# Patient Record
Sex: MALE | Race: WHITE
[De-identification: names, ages, dates, MRNs, and addresses within clinical notes are randomized per-mention and may not be internally consistent; named-entity substitution may affect disease eponyms.]

---

## 2017-10-13 ENCOUNTER — HOSPITAL ENCOUNTER (EMERGENCY)
Dept: HOSPITAL 84 - D.ER | Age: 52
Discharge: ANOTHER HOSPITAL | End: 2017-10-13
Payer: MEDICARE

## 2017-10-13 VITALS — BODY MASS INDEX: 26.1 KG/M2

## 2017-10-13 DIAGNOSIS — F23: Primary | ICD-10-CM

## 2017-10-13 DIAGNOSIS — B20: ICD-10-CM

## 2017-10-13 DIAGNOSIS — R45.851: ICD-10-CM

## 2017-10-13 DIAGNOSIS — F17.200: ICD-10-CM

## 2017-10-13 LAB
ALBUMIN SERPL-MCNC: 4.1 G/DL (ref 3.4–5)
ALP SERPL-CCNC: 58 U/L (ref 46–116)
ALT SERPL-CCNC: 25 U/L (ref 10–68)
AMPHETAMINES UR QL SCN: NEGATIVE QUAL
ANION GAP SERPL CALC-SCNC: 10.4 MMOL/L (ref 8–16)
APAP SERPL-MCNC: 0 UG/ML (ref 10–30)
APPEARANCE UR: CLEAR
BARBITURATES UR QL SCN: NEGATIVE QUAL
BASOPHILS NFR BLD AUTO: 1.2 % (ref 0–2)
BENZODIAZ UR QL SCN: NEGATIVE QUAL
BILIRUB SERPL-MCNC: 0.31 MG/DL (ref 0.2–1.3)
BILIRUB SERPL-MCNC: NEGATIVE MG/DL
BUN SERPL-MCNC: 18 MG/DL (ref 7–18)
BZE UR QL SCN: NEGATIVE QUAL
CALCIUM SERPL-MCNC: 9.1 MG/DL (ref 8.5–10.1)
CANNABINOIDS UR QL SCN: NEGATIVE QUAL
CHLORIDE SERPL-SCNC: 104 MMOL/L (ref 98–107)
CO2 SERPL-SCNC: 27.9 MMOL/L (ref 21–32)
COLOR UR: (no result)
CREAT SERPL-MCNC: 1 MG/DL (ref 0.6–1.3)
EOSINOPHIL NFR BLD: 8.6 % (ref 0–7)
ERYTHROCYTE [DISTWIDTH] IN BLOOD BY AUTOMATED COUNT: 13.5 % (ref 11.5–14.5)
ETHANOL SERPL-MCNC: 1 MG/DL (ref 0–10)
GLOBULIN SER-MCNC: 2.9 G/L
GLUCOSE SERPL-MCNC: 117 MG/DL (ref 74–106)
HCT VFR BLD CALC: 39.2 % (ref 42–54)
HGB BLD-MCNC: 13.1 G/DL (ref 13.5–17.5)
IMM GRANULOCYTES NFR BLD: 0 % (ref 0–5)
KETONES UR STRIP-MCNC: NEGATIVE MG/DL
LITHIUM SERPL-SCNC: 0.66 MMOL/L (ref 0.6–1.2)
LYMPHOCYTES NFR BLD AUTO: 27.2 % (ref 15–50)
MCH RBC QN AUTO: 33.4 PG (ref 26–34)
MCHC RBC AUTO-ENTMCNC: 33.4 G/DL (ref 31–37)
MCV RBC: 100 FL (ref 80–100)
MONOCYTES NFR BLD: 6 % (ref 2–11)
NEUTROPHILS NFR BLD AUTO: 57 % (ref 40–80)
NITRITE UR-MCNC: NEGATIVE MG/ML
OPIATES UR QL SCN: POSITIVE QUAL
OSMOLALITY SERPL CALC.SUM OF ELEC: 278 MOSM/KG (ref 275–300)
PCP UR QL SCN: NEGATIVE QUAL
PH UR STRIP: 5 [PH] (ref 5–6)
PLATELET # BLD: 156 10X3/UL (ref 130–400)
PMV BLD AUTO: 10.1 FL (ref 7.4–10.4)
POTASSIUM SERPL-SCNC: 4.3 MMOL/L (ref 3.5–5.1)
PROT SERPL-MCNC: 7 G/DL (ref 6.4–8.2)
PROT UR-MCNC: NEGATIVE MG/DL
RBC # BLD AUTO: 3.92 10X6/UL (ref 4.2–6.1)
SALICYLATES SERPL-MCNC: 2.9 MG/DL (ref 2.8–20)
SODIUM SERPL-SCNC: 138 MMOL/L (ref 136–145)
SP GR UR STRIP: 1 (ref 1–1.02)
UROBILINOGEN UR-MCNC: NORMAL MG/DL
WBC # BLD AUTO: 6 10X3/UL (ref 4.8–10.8)

## 2017-10-24 ENCOUNTER — HOSPITAL ENCOUNTER (OUTPATIENT)
Dept: HOSPITAL 84 - D.ER | Age: 52
Setting detail: OBSERVATION
LOS: 17 days | Discharge: HOME | End: 2017-11-10
Attending: FAMILY MEDICINE | Admitting: FAMILY MEDICINE
Payer: MEDICARE

## 2017-10-24 VITALS
BODY MASS INDEX: 21.26 KG/M2 | BODY MASS INDEX: 21.26 KG/M2 | WEIGHT: 124.51 LBS | HEIGHT: 64 IN | WEIGHT: 124.51 LBS | HEIGHT: 64 IN | BODY MASS INDEX: 21.26 KG/M2

## 2017-10-24 DIAGNOSIS — B20: ICD-10-CM

## 2017-10-24 DIAGNOSIS — F20.9: ICD-10-CM

## 2017-10-24 DIAGNOSIS — G89.29: ICD-10-CM

## 2017-10-24 DIAGNOSIS — Z02.2: Primary | ICD-10-CM

## 2017-10-24 DIAGNOSIS — F31.9: ICD-10-CM

## 2017-10-24 LAB
ALBUMIN SERPL-MCNC: 3.7 G/DL (ref 3.4–5)
ALP SERPL-CCNC: 52 U/L (ref 46–116)
ALT SERPL-CCNC: 25 U/L (ref 10–68)
AMPHETAMINES UR QL SCN: NEGATIVE QUAL
ANION GAP SERPL CALC-SCNC: 10.4 MMOL/L (ref 8–16)
APPEARANCE UR: CLEAR
BARBITURATES UR QL SCN: NEGATIVE QUAL
BASOPHILS NFR BLD AUTO: 0.7 % (ref 0–2)
BENZODIAZ UR QL SCN: NEGATIVE QUAL
BILIRUB SERPL-MCNC: 0.36 MG/DL (ref 0.2–1.3)
BILIRUB SERPL-MCNC: NEGATIVE MG/DL
BUN SERPL-MCNC: 16 MG/DL (ref 7–18)
BZE UR QL SCN: NEGATIVE QUAL
CALCIUM SERPL-MCNC: 9 MG/DL (ref 8.5–10.1)
CANNABINOIDS UR QL SCN: NEGATIVE QUAL
CHLORIDE SERPL-SCNC: 108 MMOL/L (ref 98–107)
CO2 SERPL-SCNC: 29.8 MMOL/L (ref 21–32)
COLOR UR: YELLOW
CREAT SERPL-MCNC: 0.9 MG/DL (ref 0.6–1.3)
EOSINOPHIL NFR BLD: 2.8 % (ref 0–7)
ERYTHROCYTE [DISTWIDTH] IN BLOOD BY AUTOMATED COUNT: 13.3 % (ref 11.5–14.5)
GLOBULIN SER-MCNC: 2.8 G/L
GLUCOSE SERPL-MCNC: 73 MG/DL (ref 74–106)
GLUCOSE SERPL-MCNC: NEGATIVE MG/DL
HCT VFR BLD CALC: 38.7 % (ref 42–54)
HGB BLD-MCNC: 12.8 G/DL (ref 13.5–17.5)
IMM GRANULOCYTES NFR BLD: 0 % (ref 0–5)
INR PPP: 1.07 (ref 0.85–1.17)
KETONES UR STRIP-MCNC: NEGATIVE MG/DL
LYMPHOCYTES NFR BLD AUTO: 20.8 % (ref 15–50)
MCH RBC QN AUTO: 33.6 PG (ref 26–34)
MCHC RBC AUTO-ENTMCNC: 33.1 G/DL (ref 31–37)
MCV RBC: 101.6 FL (ref 80–100)
MONOCYTES NFR BLD: 7.9 % (ref 2–11)
NEUTROPHILS NFR BLD AUTO: 67.8 % (ref 40–80)
NITRITE UR-MCNC: NEGATIVE MG/ML
OPIATES UR QL SCN: POSITIVE QUAL
OSMOLALITY SERPL CALC.SUM OF ELEC: 286 MOSM/KG (ref 275–300)
PCP UR QL SCN: NEGATIVE QUAL
PH UR STRIP: 5 [PH] (ref 5–6)
PLATELET # BLD: 137 10X3/UL (ref 130–400)
PMV BLD AUTO: 9.7 FL (ref 7.4–10.4)
POTASSIUM SERPL-SCNC: 4.2 MMOL/L (ref 3.5–5.1)
PROT SERPL-MCNC: 6.5 G/DL (ref 6.4–8.2)
PROT UR-MCNC: NEGATIVE MG/DL
PROTHROMBIN TIME: 13.8 SECONDS (ref 11.6–15)
RBC # BLD AUTO: 3.81 10X6/UL (ref 4.2–6.1)
SODIUM SERPL-SCNC: 144 MMOL/L (ref 136–145)
SP GR UR STRIP: 1.02 (ref 1–1.02)
UROBILINOGEN UR-MCNC: NORMAL MG/DL
WBC # BLD AUTO: 5.8 10X3/UL (ref 4.8–10.8)

## 2017-10-24 NOTE — NUR
Patient arrived from the ER via wheelchair, alert and oriented to call light
and room. Call light in reach.

## 2017-10-25 VITALS — DIASTOLIC BLOOD PRESSURE: 87 MMHG | SYSTOLIC BLOOD PRESSURE: 103 MMHG

## 2017-10-25 VITALS — DIASTOLIC BLOOD PRESSURE: 64 MMHG | SYSTOLIC BLOOD PRESSURE: 143 MMHG

## 2017-10-25 VITALS — SYSTOLIC BLOOD PRESSURE: 119 MMHG | DIASTOLIC BLOOD PRESSURE: 59 MMHG

## 2017-10-25 VITALS — SYSTOLIC BLOOD PRESSURE: 135 MMHG | DIASTOLIC BLOOD PRESSURE: 79 MMHG

## 2017-10-25 VITALS — SYSTOLIC BLOOD PRESSURE: 118 MMHG | DIASTOLIC BLOOD PRESSURE: 72 MMHG

## 2017-10-25 NOTE — NUR
PT LYING IN BED, EYES CLOSED, RESPIRATIONS EVEN AND UNLABORED. PT IS EASILY
ROUSABLE TO VERBAL STIMULI, DENIES ANY NEEDS. I DID OFFER PT FOOD IN WHICH HE
ACCEPTED. PT GIVEN A TURKEY SANDWICH AND APPLE JUICE. PT IS DIFFICULT TO
UNDERSTAND AT TIMES, AS HE SPEAKS FAST AND HIS SPEECH IS GARBLED. PT TO CALL
WHEN NEEDING ANY ASSISTANCE. CONTINUE TO MONITOR CLOSELY. BED LOW, CALL LIGHT
IN REACH, SIDE RAILS X 2, HOB FLAT.

## 2017-10-25 NOTE — NUR
CARRI DUNHAM CALLED AND ASKED ABOUT PATIENT, ASKED PATIENT WHO SHE WAS AND IF
I COULD GIVE INFORMATION AND HE STATED "NO JUST TELL HER I AM THINKING ABOUT
PRATIBHA AND KELY", CARRI [id number] AND SHE STATED TO TELL HIM "PRATIBHA AND KELY
LOVE HIM" PATIENT INFORMED.

## 2017-10-25 NOTE — NUR
SPOKE WITH DR GRUBER NURSE INFORMED HER THAT DR RIOS NEEDED TO ADDRESS HOME
MEDICATIONS. FAXED PATIENT DRUG LIST TO OFFICE. CPOC

## 2017-10-25 NOTE — NUR
RECIEVED REPORT ON PATIENT, PATIENT IS ALERT AND ORIENTED AT THIS TIME.
PATIENT IS LAYING IN BED AT THIS TIME, DENIES ANY PAIN AT THIS TIME. PATIENT
INFORMED ME THAT HE WAS MISTREATED AT THE HOME HE WAS AT BEFORE ADMISSION, AND
THIS IS WHY HE IS HERE HE IS NEEDING PLACEMENT. WILL SPEAK WITH CASE
MANAGEMENT. PATIENT DENIES ANY OTHER NEEDS. WILL CONT TO MONITOR. BED LOW AND
LOCKED, CALL LIGHT IN REACH. CPOC

## 2017-10-25 NOTE — NUR
CASE MANAGEMENT WORKING WITH PATIENT, TRYING TO FIND PATIENT PLACEMENT.
PATIENT STATES THE PEOPLE HE LIVED WITH HAVE BEEN DRAWING FROM HIS BANK
ACCOUNT AND HIS EBT CARD. WILL LET CASE MANAGEMENT INVESTIGATING SITUATION.
CPOC

## 2017-10-25 NOTE — NUR
Patient Name: AZUL ETIENNE Admission Status: ER
Accout number: [id number] Admission Date: 10-
: 1965 Admission Diagnosis:
Attending: RACQUEL RIOS Current LOS: 1
 
Anticipated DC Date: 10-
Planned Disposition: Assisted Living
Primary Insurance: MEDICARE A & B
PLANNED EXTERNAL PROVIDER: FIRST AVAILABLE THAT WILL ACCEPT
 
Discharge Planning Comments:
* Is the patient Alert and Oriented? Yes 0
* How many steps to enter\exit or inside your home? 3 0
* PCP DR. EMILY DWYER 0
* Pharmacy [facility] 0
* Preadmission Environment Home with Family 0
* ADLs Partial Dependent 0
* Partial ADLs (Assistance needed) Medication Management 0
* Equipment Cane
Rolling Walker 0
* Other Equipment NO MEDICAL EQUIPMENT PROVIDER PREFERENCE 0
* List name and contact numbers for known caregivers / representatives who
currently or will assist patient after discharge: NONE 0
* Community resources currently utilized Other 0
* Please name any agencies selected above. MEDICAID - [facility] HOME
CARE SERVICES 0
* Additional services required to return to the preadmission environment? Yes
0
* Can the patient safely return to the preadmission environment? No 0
* Has this patient been hospitalized within the prior 30 days at any hospital?
Yes 0
 
CM RECEIVED ORDER FOR LONG TERM PLACEMENT OR GROUP HOME. CM CALLED ADULT
PROTECTIVE SERVICES WORKER NELLIE SLAUGHTER, [phone], LEFT MESSAGE
REQUESTING ANY KNOWN INFORMATION REGARDING PT. CM CALLED CHANDNI PALOMO OF ADULT
PROCTECTIVE SERVICES,[phone], LEFT MESSAGE REQUESTING ANY KNOWN
INFORMATION REGARDING PT. CM CALLED MUKUL IVERSON OF ADULT PROTECTIVE SERVICES,
[phone], REQEUSTED ANY KNOWN INFORMATION ON PT. MUKUL DID NOT KNOW
ANYTHING ABOUT PT OR HIS CIRCUMSTANCES. CM RECEIVED RETURN CALL FROM NELLIE SLAUGHTER OF ADULT PROTECTIVE SERVICES WHO REPORTS NOT HAVING ANY ACTIVE
INVESTIGATIONS OR CASES INDICATING PT HAS A GUARDIAN WITH DEPARTMENT OF HUMAN
SERVICES.
 
CM CALLED [facility] DEPARTMENT OF HUMAN SERVICES, LEFT TWO MESSAGES FOR
ROXY THOMAS OF MEDICAID WAIVER SERVICES, [phone], REQUESTING INFORMATION
REGARDING PT'S WAIVER STATUS.
 
CM SPOKE TO PT IN ROOM WHO REPORTED THAT HE ONCE LIVES WITH [employer] WHO
WAS PT'S LEGAL GUARDIAN BUT ADULT PROTECTIVE SERVICES CAME AND PUT [facility]ANC[facility] IN A
NURSING HOME; PT HAS NOT SEEN OR HEARD FROM [facility] IN THE PAST 12 YEARS. PT
STATES HE DOES NOT HAVE A GUARDIAN NOW. [facility] HELPED HIM GET
INTO [facility] PROGRAM WITH JEREMIE DUNHAM ([phone] /
[phone]). THE WAIVER RAN OUT AND THEY HAVEN'T BEEN GETTING PAID SINCE
AUGUST OR SEPTEMBER OF LAST YEAR, ARE NOW HAVING FINANCIAL PROBLEMS AND COULD
NOT TAKE CARE OF PT ANY LONG AND LEFT HIM IN THE EMERGENCY ROOM FOR THE
HOSPITAL TO HELP HIM FIND A PLACE TO LIVE. PT DOES NOT KNOW HOW MUCH HIS CHECK
IS, BUT RECEIVES SOCIAL SECURITY FOR HIS BIPOLAR AND SCHIZOPHRENIA. PT REPORTS
HE CAN COOK IN A MICROWAVE AND CAN READ AND WRITE; HE REPORTS HAVING HIS GED.
PT STATES THAT CHRISTOPHE AND CARRI GAVE HIM THIS CARD (SHOWED CM BANK CARD FROM
HIS POCKET) AND TOLD HIM IT WAS TO GET HIS MONEY; PT REPORTS HE DOES NOT EVEN
KNOW HOW TO USE IT (THE CARD). PT HAS MEDICATION MANAGEMENT WITH [facility], NOW [facility] WITH DR. [facility] DUNHAM. PT REPORTS HIS
COUNSELOR DROPPED HIM BECAUSE THE AK[facility] WOULD NOT TAKE HIM TO HIS
APPOINTMENTS. PT REPORTS HIS CAREGIVERS MISTREATED HIM BY TELLING HIM TO SHUT
UP AND TOLD HIM HE TALKED TOO FAST. PT REPORTS HE DOES NOT WANT TO LIVE BY
HIMSELF AND HE IS USED TO SOMEONE TO ARRANGING HIS MEDICATIONS IN A BOX THAT
ARRANGES THEM FOR THE TIMES HE TAKES THEM. PT IS NOT ABLE TO DRIVE. PT USUALLY
WALKS WITH A CANE AND HAS HIS ROLLING WALKER WITH HIM. PT STATES THAT GIOVANNI
MARCK OF [facility] WAS TRYING TO GET HIM INTO A GROUP HOME. PT
REPORTS HE WANTS CM TO FIND HIM SOMEWHERE TO LIVE AND HAS NO ONE ELSE TO HELP
HIM.
 
CM CALLED [facility], [phone], LEFT MULTIPLE
MESSAGES FOR GIOVANNI CARMICHAELMICAH; CM CALLED AND SPOKE TO THE  WHO DETERMINED
THAT GIOVANNI IS NOT AT WORK, CM ASKED TO SPEAK TO WHOEVER COULD HELP. CM SPOKE
TO OUTPATIENT DIRECTOR EMORY, [phone], WHO REPORTS THEY HAVE NO GROUP
HOMES BUT CAN ASSIST WITH PROVIDING RECORDS TO ASSIST WITH PLACEMENT.
EMORY REPORTED THAT THE AK[facility]AR TRIED TO LEAVE PT WITH THEM LAST FRIDAY
EVENING; WHEN [facility] INFORMED THEM THAT APS WOULD BE CALLED, THE GIOVANA LEFT
WITH PATIENT. CM CALLED GUEVARA ZHAO [facility] BEHAVIORAL RECORDS, [phone],
REQUESTED PSYCHIATRIC RECORDS BE FAXED TO CM TO ASSIST WITH PLACEMENT.
 
CM CALLED ADULT PROTECTIVE SERVICES, [phone], REPORTED ABANDONMENT.
 
CM CALLED THE [facility] IN [place], SPOKE TO JOSEMANUEL WHO REPORTS HAVING
APARTMENT AVAILABLE BUT PT DOES NOT HAVE THE PROPER MEDICAID AND WILL NEED TO
APPLY FOR ASSISTED LIVING MEDICAID AND IT WILL HAVE TO BE APPROVED PRIOR TO
ANY APPLICATION CONSIDERATION. THE MEDICAID REVIEW PROCESS TAKES 45 OR MORE
DAYS.
 
CM CALLED ELÍAS ALVAREZ WITH [facility], [phone], EXT [phone];
ELÍAS REPORTS HE MAY HAVE A BED NEXT WEDNESDAY AND CAN REVIEW RECORDS FOR
ADMISSION. ALL RECORDS FOR PLACEMENT REFERRAL TO BE FAXED [phone].
 
CM CALLED [facility] FOR DELIA, [phone], VOICE MAIL WAS FULL, CM LEFT
CALL BACK NUMBER IN PAGER, DID NOT RECEIVE RETURN CALL.
 
CM DOES NOT BELIEVE PT TO BE APPROPRIATE FOR HOMELESS SHELTER PLACEMENT BASED
ON ABOVE ASSESSSMENT. CM TO FAX REFERRAL TO [facility] GROUP ONCE RECORDS ARE
RECEIVED FROM [facility] AND [facility]. CM WILL CONTINUE TO SEEK
PLACEMENT.
 
: Grady Goff
 
 
 [id number]: Grady Goff

## 2017-10-26 VITALS — SYSTOLIC BLOOD PRESSURE: 113 MMHG | DIASTOLIC BLOOD PRESSURE: 65 MMHG

## 2017-10-26 VITALS — SYSTOLIC BLOOD PRESSURE: 119 MMHG | DIASTOLIC BLOOD PRESSURE: 64 MMHG

## 2017-10-26 VITALS — DIASTOLIC BLOOD PRESSURE: 61 MMHG | SYSTOLIC BLOOD PRESSURE: 113 MMHG

## 2017-10-26 VITALS — SYSTOLIC BLOOD PRESSURE: 126 MMHG | DIASTOLIC BLOOD PRESSURE: 79 MMHG

## 2017-10-26 VITALS — SYSTOLIC BLOOD PRESSURE: 147 MMHG | DIASTOLIC BLOOD PRESSURE: 92 MMHG

## 2017-10-26 VITALS — SYSTOLIC BLOOD PRESSURE: 122 MMHG | DIASTOLIC BLOOD PRESSURE: 71 MMHG

## 2017-10-26 NOTE — HP
PATIENT: AZUL ETIENNE                                 MEDICAL RECORD: [id number]
ACCOUNT: [id number]                                    LOCATION:[address]
: 63                                            ADMISSION DATE: 10/24/17
                                                         
 
                             HISTORY AND PHYSICAL EXAMINATION
 
 
HISTORY OF PRESENT ILLNESS:  A 52-year-old  male brought in to the
Emergency Room, reports he was sent over here from [facility] for
placement.  He has no acute change in his medical status.  He had been living at
somebody's house.  No family is with him presently and he was told by [facility] to bring him to the Emergency Room and he would be placed.  He has a
history of bipolar disorder, schizophrenia, long-term HIV.  He is followed by
Dr. Donnie Eller for his HIV.  He is stable.  No acute exacerbations.  He
reports he has been stable on his current medications.  He has no thoughts of
harm to himself or others.  He is afebrile, denies chest pain.
 
REVIEW OF SYSTEMS:
HEENT:  Denies cephalgia, visual changes, tinnitus or epistaxis.
CARDIOVASCULAR:  Denies chest pain or palpitations.
PULMONARY:  Denies hemoptysis.
GASTROINTESTINAL:  Denies hematemesis, hematochezia or melena.
GENITOURINARY:  Denies dysuria.
MUSCULOSKELETAL:  No acute changes.
ENDOCRINE:  Denies polyuria, polydipsia or polyphagia.
PSYCHIATRIC:  History as above.  Reports being stable on his current
medications.
 
CURRENT MEDICATIONS:  Reviewed and as per chart.
 
PHYSICAL EXAMINATION:
VITAL SIGNS:  Temp 98.2, blood pressure is 143/94, heart rate 98, respirations
16 and O2 sats 99% room air.
GENERAL:  The patient is dressed in street clothes, sitting up.  Alert. 
Oriented to person, place and time.  Answers appropriately, in no acute
distress.
HEENT:  Normocephalic, atraumatic.  Eyes:  Pupils are equally round and
reactive.  Ears:  Canals patent.  Nose:  Nares patent.  Throat:  No erythema, no
exudates.
NECK:  Supple.  No lymphadenopathy.
HEART:  Regular rate and rhythm.
LUNGS:  Clear.
ABDOMEN:  Soft and nontender.  Bowel sounds positive in all 4 quadrants.
EXTREMITIES:  Present times 4.  No edema.
NEUROLOGIC:  Intact.
SKIN:  Warm and dry.  No rash.
PSYCHIATRIC:  The patient is calm, alert, answers appropriately.  Denies any
thought of harm to himself or others.  Does have extensive history, but appears
stable on current medications.
 
ASSESSMENT AND PLAN:  Social admit for placement, multiple comorbidities with
history of bipolar disorder and stable human immunodeficiency virus.  We will
continue medications.   is consulted for placement.  This appears
that it should have been dealt with by the  at [facility], and this patient was not done any service by being shipped to the ER
with his past history.
 
 
 
 
HISTORY AND PHYSICAL                           [id number]    AZUL ETIENNE         
 
 
TRANSINT:[id number] Voice Confirmation ID: [id number] DOCUMENT ID: [id number]
 
 
                                           
                                           RACQUEL RIOS DO            
 
 
 
Electronically Signed by RACQUEL CUBA on 10/26/17 at 0802
 
 
 
 
 
 
 
 
 
 
 
 
 
 
 
 
 
 
 
 
 
 
 
 
 
 
 
 
 
 
 
 
 
 
 
 
 
 
CC:                                                             [id number]
DICTATION DATE: 10/25/17 0806     :     10/25/17 0851      ADM IN  
                                                                              
[facility]                                          
[address] [place] [address]

## 2017-10-26 NOTE — NUR
Patient Name: AZUL ETIENNE
Encounter No: [id number]
: 1965
Primary Insurance: MEDICARE A & B
Anticipated DC Date: 10-
Planned Disposition: Assisted Living
External Planned Provider: FIRST AVAILABLE ACCEPTING FACILITY OR HOME
 
 
DCP follow-up note: CM CALLED MUKUL IVERSON OF ADULT PROTECTIVE SERVICES,
[phone], TO REQUEST NAME AND LOCATION OF A RESIDENTIAL TREATMENT FACILITY
THAT MAY ACCEPT PT AS HE HAS ASSISTED CM IN THE PAST WITH ANOTHER DIFFICULT TO
PLACE PT. CM LEFT MESSAGE ASKING FOR THE INFORMATION.
 
CM FAXED INITIAL REFERRAL TO ELÍAS ALVAREZ OF [facility] WHO MAY
HAVE A ROOM AVAILABLE BY WEDNEDAY OF NEXT WEE, FAXED [phone]. CM WILL NEED
TO SEND PSYCHIATRIC RECORD FROM [facility] ONCE RECEIVED.
 
CM CALLED DELIA OF [facility], [phone], VOICE MAIL WAS FULL, CM
PAGED WITH PHONE NUMBER. WAITING ON RETURN CALL.
 
CM CALLED [facility], [phone], SPOKE TO GABY
WHO REPORTS HE DOES NOT THINK THEY WOULD ACCEPT PT AND HAVE NO ROOMS ANYWAY.
 
CM SPOKE TO CRISTIANE OF [facility], PT CALLED HER AND SHE CAME TO
ASSIST WITH GETTING PT INTO LONG TERM CARE AT PT'S REQUEST. CM SPOKE TO PT WHO
WOULD LIKE TO GO THE NURSING HOME IF POSSIBLE. MELVINA PROVIDED CRISTIANE WITH MEDICAL
RECORD, PT HAS NO "SKILLED NEED" AND IS NOT APPROPRIATE FOR LONG TERM CARE IN
A NURSING HOME. PT NOTIFIED.
 
CM TO CONTINUE TO SEEK PLACEMENT.
 
Grady Goff, CASE MANAGEMENT

## 2017-10-26 NOTE — NUR
AMBULATING IN ROOM. ALERT/ORIENTED X 4. DENIES ANY NEEDS. HAS A SPEECH
IMPEDIMENT AND SLIGHTLY DIFFICULT TO UNDERSTAND. ORIENTED TO CALL LIGHT FOR
ANY NEEDS.

## 2017-10-26 NOTE — NUR
AM ROUNDS - PT IN BED AND APPEARS TO BE SLEEPING AT THIS TIME WITH EQUAL AND
NON LABORED BREATHING.  PT IS ON ROOM AIR.  NO IV.  BED AT LOWEST POSITION.
CALL BELL IN USE/REACH.  SIDE RAILS UP X2.  WILL CONTINUE TO MONITOR

## 2017-10-27 VITALS — DIASTOLIC BLOOD PRESSURE: 57 MMHG | SYSTOLIC BLOOD PRESSURE: 98 MMHG

## 2017-10-27 VITALS — SYSTOLIC BLOOD PRESSURE: 121 MMHG | DIASTOLIC BLOOD PRESSURE: 86 MMHG

## 2017-10-27 VITALS — DIASTOLIC BLOOD PRESSURE: 45 MMHG | SYSTOLIC BLOOD PRESSURE: 100 MMHG

## 2017-10-27 VITALS — SYSTOLIC BLOOD PRESSURE: 95 MMHG | DIASTOLIC BLOOD PRESSURE: 52 MMHG

## 2017-10-27 VITALS — SYSTOLIC BLOOD PRESSURE: 109 MMHG | DIASTOLIC BLOOD PRESSURE: 67 MMHG

## 2017-10-27 VITALS — SYSTOLIC BLOOD PRESSURE: 129 MMHG | DIASTOLIC BLOOD PRESSURE: 70 MMHG

## 2017-10-27 NOTE — NUR
Patient Name: AZUL ETIENNE
Encounter No: [id number]
: 1965
Primary Insurance: MEDICARE A & B
Anticipated DC Date: 10-
Planned Disposition: Assisted Living
External Planned Provider: FIRST AVAILABLE ACCEPTING FACILITY
 
DCP follow-up note: CM SPOKE TO DR. RIOS AND PROVIDED UPDATE; DR. RIOS
SUGGESTED CM CALL [facility] TO SEE IF THEY CAN ASSIST OR HAVE
SUGGESTIONS FOR PLACEMENT. CM CALLED THE [facility], [phone], SPOKE TO
RODRIGUEZ WHO REPORTS THAT HE HAD SPOKEN TO PT SEVERAL TIMES REGARDING GETTING OUT
OF THE LIVING SITUATION HE WAS IN. RODRIGUEZ BELIEVES MEDICAID STOPPED PAYING FOR
PT'S HOME CARE AND THAT IS WHY THE [facility] DIDN'T WANT PT ANYMORE. RODRIGUEZ
REPORTS HAVING "NO ANSWERS" FOR CM REGARDING PLACEMENT FOR PT.
 
CM CALLED DELIA OF [facility], [phone], VOICE MAIL WAS FULL, CM
PAGED WITH PHONE NUMBER. WAITING ON RETURN CALL.
 
CM RECEIVED CALL FROM NELLIE SLAUGHTER OF [facility] WHO HAS
RECEIVED CM REFERRAL. NELLIE CAME TO HOSPITAL, MET WITH PT AND RECEIVED
RECORDS FROM HOSPITAL STAY. NELLIE REPORTS NO HOLD WILL BE TAKEN ON PT AND
SHE WILL ASSIST CM WITH PLACEMENT. PT REPORTS TO NELLIE THAT HE WILL GO
ANYWHERE TO LIVE. JANELL DOES NOT THINK A HOMELESS SHELTER IS APPROPRIATE.
NELLIE SUGGESTED [facility] IN [place], WHO MAY NOT REQUIRE
PT TO BE ELIGIBLE FOR ASSISTED LIVING MEDICAID PRIOR TO ACCEPTANCE.
CM CALLED [facility], [phone], SPOKE TO KEYA WHO
REPORTS MELANIE WOULD BE PERSON TO SPEAK TO AND SHE WILL HAVE MELANIE CALL CM.
CM CALLED [facility], [phone], SPOKE TO EVA WHO EXPLAINED
THE PROCESS OF EVALUATION FOR ADMISSION. THEY STAFF FOR ADMISSION ONLY ON
. PACKET REQUIRES:
PHYCHIATRIC EVAL AND TREATMENT HISTORY, SOCIAL HISTORY, SUBSTANCE ABUSE
HISTORY AND TREATMENT, PHYSICAL EXAM AND MED HISTORY, FACE SHEET, AFTERCARE
PLAN, LEGAL DOCUMENTATION (COURT ORDERS, ECT.), MEDICATION RECORD, CURRENT
LABS, PHYCHOLOGICAL INFORMATION, PHYSICIANS PROGRESS NOTES, DISCHARGE SUMMARY,
SIGNED SMI CERTIFICATION, GUARDIANSHIP INFORMATION AND VERBAL AND WRITTEN
COMMUNICATION.
ERICHUM[facility] HAS NOT RECEIVED ANY APPLICATIONS FOR ADMISSION OF THIS PT TO DATE.
EVA REPORTS HE SPOKE TO CARRI DUNHAM VIA PHONE REGARDING PLACEMENT.
CM RECEIVED RELEASED FOR [facility]; CM
DISCUSSED PLACEMENT WITH PT WHO REPORTS THAT ALTHOUGH HE PREFERS TO REMAIN IN
[place], HE WILL GO ANYWHERE IN THE [place] IF HE HAS TO. CM FAXED COMPLETE
MEDICAL CHART AND SIGNED RELEASES FOR MEDICAL RECORDS TO [facility] AT
[phone].
 
CM HAS NOT RECEIVED MEDICAL RECORDS AS OF YET FROM PT'S MENTAL HEALTH
PROVIDER; CALLED [facility],
[phone], LEFT TWO MESSAGES FOR GUEVARA REQUESTING MEDICAL RECORDS BE FAXED
TO CM FOR PURPOSE OF PLACEMENT.
 
CM CALLED [facility], [phone], SPOKE TO KEYA WHO REPORTS THAT MELANIE
IS NOT IN TODAY BUT CM CAN FAX REFERRAL: CM FAXED REFERRAL TO [facility] [phone].
 
PT SPOKE TO PT IN [facility], PT REPORTS EMERGENCY CONTACTS OF HIS MOTHER,
ANGIE ETIENNE, [phone] AND BROTHER, VICKY ETIENNE, [phone]. PT
ASKED CM TO CALL HIS MOTHER AND GIVE HER AN UPDATE: CM CALLED ANGIE REED
[phone], PROVIDED UPDATE ON PLACEMENT EFFORTS. LORI AGAIN REPORTS
SHE IS NOT ABLE TO CARE FOR PT AT HER HOME AND HAS NO FAMILY OR FRIENDS THAT
CAN.  BEDSIDE NURSE UPDATED ON PLACEMENT EFFORTS.
 
CM WAITING ADMISSION DETERMINATIONS FROM [facility] AND [facility].
 
Grady Goff

## 2017-10-27 NOTE — NUR
AM ROUNDS - PT IS IN BED AND AWAKE AT THIS TIME.  NO IV.  PT ON ROOM AIR.  PT
IS UP AD DEWAYNE.  BED AT LOWEST POSITION.  CALL BELL IN USE/REACH.  SIDE RAILS UP
X2.  NO NEEDS AT THIS TIME.  WILL COTNINUE TO MONITOR

## 2017-10-27 NOTE — NUR
AMBULATING IN ROOM. ALERT/ORIENTED X 4. DENIES ANY NEEDS OR DISCOMFORTS. BED
IS LOW WITH SR UP X2. ORIENTED TO CALL LIGHT.

## 2017-10-28 VITALS — DIASTOLIC BLOOD PRESSURE: 79 MMHG | SYSTOLIC BLOOD PRESSURE: 145 MMHG

## 2017-10-28 VITALS — SYSTOLIC BLOOD PRESSURE: 106 MMHG | DIASTOLIC BLOOD PRESSURE: 71 MMHG

## 2017-10-28 VITALS — DIASTOLIC BLOOD PRESSURE: 81 MMHG | SYSTOLIC BLOOD PRESSURE: 129 MMHG

## 2017-10-28 VITALS — SYSTOLIC BLOOD PRESSURE: 105 MMHG | DIASTOLIC BLOOD PRESSURE: 60 MMHG

## 2017-10-28 VITALS — DIASTOLIC BLOOD PRESSURE: 71 MMHG | SYSTOLIC BLOOD PRESSURE: 107 MMHG

## 2017-10-28 NOTE — NUR
CNA PRESENT IN ROOM TAKING VS. ALERT/AWAKE ORIENTED X 4. NO NEEDS OR CONCERNS
VOICED. BED IS LOW WITH SR UP X2. ORIENTED TO CALL LIGHT FOR ANY NEEDS.

## 2017-10-28 NOTE — NUR
RECEIVED REPORT. ASSUMED CARE OF PATIENT. CALL LIGHT WITHIN REACH. PATIENT
AMBULATING AROUND ROOM TRYING TO CLEAN UP URINE INCONTINENT EPISODE. DENIES
NEEDS. NO DISTRESS. CALL LIGHT WITHIN REACH.

## 2017-10-28 NOTE — NUR
PATIENT AT NURSES STATION, AMBULATING AROUND UNIT WITH WALKER. NO DISTRESS.
DENIES NEEDS AT THIS TIME.

## 2017-10-29 VITALS — SYSTOLIC BLOOD PRESSURE: 118 MMHG | DIASTOLIC BLOOD PRESSURE: 72 MMHG

## 2017-10-29 VITALS — DIASTOLIC BLOOD PRESSURE: 65 MMHG | SYSTOLIC BLOOD PRESSURE: 118 MMHG

## 2017-10-29 VITALS — SYSTOLIC BLOOD PRESSURE: 101 MMHG | DIASTOLIC BLOOD PRESSURE: 65 MMHG

## 2017-10-29 VITALS — DIASTOLIC BLOOD PRESSURE: 67 MMHG | SYSTOLIC BLOOD PRESSURE: 109 MMHG

## 2017-10-29 VITALS — SYSTOLIC BLOOD PRESSURE: 95 MMHG | DIASTOLIC BLOOD PRESSURE: 53 MMHG

## 2017-10-29 VITALS — SYSTOLIC BLOOD PRESSURE: 91 MMHG | DIASTOLIC BLOOD PRESSURE: 55 MMHG

## 2017-10-29 NOTE — NUR
REPORT RECIEVED FROM OFF GOING RN. PT AWAKE AND RESTING IN BED. ASSISTED PT TO
EMPTY HER COLOSTOMY OF 400ML LIQUID STOOL. REQUESTED JUICE PROVIDED. NO OTHER
NEEDS.

## 2017-10-29 NOTE — NUR
BEDTIME MEDS GIVEN. PT REQUESTED TYLENOL FOR GENERALIZED PAIN/DISCOMFORT. HE
HAS PERFORMED ALL HIS BEDTIME ADLS. VOICING NO OTHER NEEDS. CPOC.

## 2017-10-30 VITALS — SYSTOLIC BLOOD PRESSURE: 112 MMHG | DIASTOLIC BLOOD PRESSURE: 64 MMHG

## 2017-10-30 VITALS — DIASTOLIC BLOOD PRESSURE: 68 MMHG | SYSTOLIC BLOOD PRESSURE: 116 MMHG

## 2017-10-30 VITALS — SYSTOLIC BLOOD PRESSURE: 107 MMHG | DIASTOLIC BLOOD PRESSURE: 70 MMHG

## 2017-10-30 VITALS — DIASTOLIC BLOOD PRESSURE: 68 MMHG | SYSTOLIC BLOOD PRESSURE: 110 MMHG

## 2017-10-30 VITALS — SYSTOLIC BLOOD PRESSURE: 93 MMHG | DIASTOLIC BLOOD PRESSURE: 63 MMHG

## 2017-10-30 LAB
ALBUMIN SERPL-MCNC: 3.4 G/DL (ref 3.4–5)
ALP SERPL-CCNC: 45 U/L (ref 46–116)
ALT SERPL-CCNC: 24 U/L (ref 10–68)
ANION GAP SERPL CALC-SCNC: 12.4 MMOL/L (ref 8–16)
BASOPHILS NFR BLD AUTO: 0.8 % (ref 0–2)
BILIRUB SERPL-MCNC: 0.21 MG/DL (ref 0.2–1.3)
BUN SERPL-MCNC: 13 MG/DL (ref 7–18)
CALCIUM SERPL-MCNC: 8.9 MG/DL (ref 8.5–10.1)
CHLORIDE SERPL-SCNC: 106 MMOL/L (ref 98–107)
CO2 SERPL-SCNC: 27.2 MMOL/L (ref 21–32)
CREAT SERPL-MCNC: 0.8 MG/DL (ref 0.6–1.3)
EOSINOPHIL NFR BLD: 6.7 % (ref 0–7)
ERYTHROCYTE [DISTWIDTH] IN BLOOD BY AUTOMATED COUNT: 13 % (ref 11.5–14.5)
GLOBULIN SER-MCNC: 2.8 G/L
GLUCOSE SERPL-MCNC: 81 MG/DL (ref 74–106)
HCT VFR BLD CALC: 37 % (ref 42–54)
HGB BLD-MCNC: 12 G/DL (ref 13.5–17.5)
IMM GRANULOCYTES NFR BLD: 0.2 % (ref 0–5)
LYMPHOCYTES NFR BLD AUTO: 51.4 % (ref 15–50)
MCH RBC QN AUTO: 32.8 PG (ref 26–34)
MCHC RBC AUTO-ENTMCNC: 32.4 G/DL (ref 31–37)
MCV RBC: 101.1 FL (ref 80–100)
MONOCYTES NFR BLD: 8.1 % (ref 2–11)
NEUTROPHILS NFR BLD AUTO: 32.8 % (ref 40–80)
OSMOLALITY SERPL CALC.SUM OF ELEC: 279 MOSM/KG (ref 275–300)
PLATELET # BLD: 141 10X3/UL (ref 130–400)
PMV BLD AUTO: 10.5 FL (ref 7.4–10.4)
POTASSIUM SERPL-SCNC: 4.6 MMOL/L (ref 3.5–5.1)
PROT SERPL-MCNC: 6.2 G/DL (ref 6.4–8.2)
RBC # BLD AUTO: 3.66 10X6/UL (ref 4.2–6.1)
SODIUM SERPL-SCNC: 141 MMOL/L (ref 136–145)
WBC # BLD AUTO: 5.1 10X3/UL (ref 4.8–10.8)

## 2017-10-30 NOTE — NUR
Patient Name: AZUL ETIENNE
Encounter No: [id number]
: 1965
Primary Insurance: MEDICARE A & B
Anticipated DC Date: 10-
Planned Disposition: Assisted Living
External Planned Provider: FIRST ACCEPTING FACILITY
 
 
DCP follow-up note: CM RECEIVED CALL FROM GUEVARA OF [facility] WHO REPORTS SHE WAS OUT THURSDAY AND FRIDAY OF LAST WEEK. SHE REPORTS
SENDING REQUESTED MENTAL HEALTH RECORDS AND DOES NOT KNOW WHY CM DID NOT
RECEIVE THEM LAST WEEK. GUEVARA WILL REFAX THE RECORDS THIS MORNING TO CM.
 
CM RECEIVED CALL FROM ROXY THOMAS OF [facility] HUMAN SERVICES,
[phone], [phone], WHO ADVISED THAT PT HAS ACTIVE SSI, SSI ARCHOICES, AAPD
WAIVER. PT'S MEDICAID IS OPEN. ROXY ADVISED THAT ASSISTED LIVING MEDICAID
WOULD HAVE TO BE APPROVED IN MOST CASES PRIOR TO ANY ASSISTED LIVING
ACCEPTANCE. ROXY HAD NO SUGGESTIONS FOR CM SEEKING PLACEMENT FOR PT.
 
CM CALLED [facility]ICES [facility] FOR [facility],
[phone], SPOKE TO SANDRO; SANDRO SUGGESTED NURSING HOME CARE, TO CONTACT
PT'S MENTAL HEALTH PROVIDER IN THE COMMUNITY FOR ASSISTANCE, BEHAVIORAL HEALTH
SERVICES FOR [place] [phone] AND [facility] ADULT FAMILY
HOMES, [facility], [phone].
 
CM CALLED BEHAVIORAL HEALTH SERVICES [facility] [phone], SPOKE TO
DR. LI WHO RECOMMENDED [facility] FOR PLACEMENT AND WILL EMAIL A LISTING OF
RESIDENTIAL TREATMENT FACILITIES FOR THE [facility] TO ASSIST CM IN SEARCH FOR
PLACEMENT. DR. LI ALSO ADVISED CM THAT LIV DE JESUS OF [facility], [phone] MAY BE ABLE TO PROVIDE SOME ASSISTANCE.
 
CM CALLED [facility], [phone], SPOKE TO LIV DE [facility]US AND GIOVANNI ACHARYA. GIOVANNI REPORTED THAT HE DID OFFER THE CAREGIVER
ASSISTANCE WITH PLACEMENT BUT SHE REFUSED AND SAID THAT [facility] HAD
ACCEPTED PT AND ONLY NEEDED ACUTE INPATIENT PSYCHIATRIC STABALIZATION FOR THEM
TO ACCEPT PT. LIV WILL CHECK AS SHE THINKS [facility]
MAY HAVE A HOUSING PROGRAM THAT INCLUDES DAY TREATMENT AND TRANSPORTATION.
 
CM CALLED ROXY THOMAS OF [facility], [phone],
, ASKED TO MAKE SURE THAT CURRENT CAREGIVERS WHO DROPPED PT OFF DID NOT
RECEIVE PAYMENT FOR THE COMING MONTH AND ASKED FOR SUGGESTIONS FOR PLACEMENT;
ROXY DIRECTED CM TO CALL THE [facility], [phone] AND
PRIVATE CARE HOME ON [facility], [phone].
CM CALLED [facility], [phone], SPOKE TO RAE AND
REQUESTED PLACEMENT FOR PT. RAE REPORTS SHE WILL HAVE HER BOSS CALL CM AS
SOON AS POSSIBLE. CM CALLED PRIVATE CARE HOME, [address] [facility],
[phone], THE LADY ANSWERING REPORTED HER HOME IS NOT ACCEPTING MEDICAID
AND PRIVATE PAY OF $5,300 PER MONTH ONLY.
 
CM RECEIVED CALL FROM LIV DE JESUS OF [facility],
[phone], WHO PROVIDED STATE WAIVER NURSE CONTACT INFORMATION TO CM: CHRISTO DE LOS [facility]ANT[facility], [phone], ; CM CALLED AND SPOKE TO CHRISTO WHO REPORTS THAT
[facility] WAS MERGED [facility] AAPD WAIVER PROGRAM, THE PROGRAM DOES NOT HAVE A
LISTING OF PROVIDERS; CLIENTS FIND THEIR OWN CAREGIVERS WHO APPLY FOR THE
WAIVER PROGRAM TO GET PAID TO CARE FOR THE PT. IF PT IS WITHOUT WAIVER
SERVICES FOR 30 DAYS, IT WILL CLOSE. CHRISTO HAD NO SUGGESTIONS ON WHERE TO SEEK
PLACEMENT FOR PATIENT.
 
CM CALLED [facility] [place], [phone], SPOKE TO CHAPARRO GOMEZ
WHO REPORTS HAVING ONLY ONE MEDICAID CARE HOME IN [place], IT IS LOCATED IN
[place], [place]. CHAPARRO TOOK CM AND PT INFORMATION AND REPORTED THAT SHE WOULD
CALL CM BACK.
 
CM RECEIVED CALL FROM COWORKER OF [facility] AT [facility] WHO WILL FAX CM AN APPLICATION TO COMPLETE FOR "[facility]" TO
CONSIDER FOR PLACEMENT.
 
CM RECEIVED CURRENT AND LIMITED MENTAL HEALTH RECORDS FROM [facility]. CM FAXED TO [facility] [phone], [facility] AT
[phone] AND [facility] [phone].
 
LEISA QIU, CASE MANAGEMENT

## 2017-10-30 NOTE — NUR
PT IS WALKING THE FLOOR WITH ASSISTANCE FROM HIS PERSONAL WALKER.  NO NEEDS AT
THIS TIME.  WILL CONTINUE TO MONITOR

## 2017-10-30 NOTE — NUR
Patient Name: AZUL ETIENNE
Encounter No: [id number]
: 1965
Primary Insurance: MEDICARE A & B
Anticipated DC Date: 10-
Planned Disposition: Assisted Living or Residential Treatment Center
External Planned Provider: first accepting provider
 
 
DCP follow-up note:
CM SPOKE TO CM DIRECTOR CANDIDO WHO SUGGESTED SKILLED NURSING FACILITY
PLACEMENT AS ALTERNATIVE FOR PT. CM REVIEWED NOTES RECEIVED FROM [facility] THAT INDICATED PT IS NOT APPROPRIATE FOR NURSING HOME PLACEMENT. CM
SPOKE TO PT IN ROOM, PT IS WILLING FOR ANY PLACEMENT AS LONG AS HE DOES NOT
LIVE ALONE. CM AND PT COMPLETED THE LION SCREENING ASSESSMENT, PT SIGNED. CM
FAXED TO [facility] WITH REQUEST FOR DR. GALARZA TO SIGN AND FAX BACK TO
CM AS DR. GALARZA IS ROUNDING FOR DR. RIOS TODAY. CM PLACED ORIGINAL ON FRONT
OF PAPER CHART WITH INDICATIONS WHERE PHYSICIAN SIGNATURE IS REQUIRED. BEDSIDE
NURSE NOTIFIED.
 
CM SPOKE TO RODRIGUEZ AT [facility], [phone], REQUESTED MEDICAL RECORDS,
FAXED SIGNED RELEASE [phone]. CM CALLED [facility],
[phone], SPOKE TO RITIKA AND REQUESTED MEDICAL RECORDS, FAXED RELEASE TO
[phone].
 
CM SPOKE TO DIDI MEZA, [facility], [phone], WHO REPORTS SHE WILL CALL AND ASSIST WITH PLACEMENT;
DIDI REPORTS THAT PREVIOUS TESTING DONE BY APS ON CLIENT DOES NOT SUPPORT
NEED FOR NURSING HOME PLACEMENT OR A GUARDIAN AND DIDI DOES NOT THINK PT
WILL PASS A LION SCREENING FOR NURSING HOME CARE DUE TO HIS HIGH LEVEL OF
FUNCTIONING. CM RECEIVED CALL BACK FROM DIDI, DIRECTED CM TO CALL [facility] IN [place] FOR POSSIBLE PLACEMENT. CM CALLED [facility],
PROVIDED BASIC INFORMATION, FAXED REFERRAL TO [facility] [phone].
 
CM WILL FAX LION ASSESSMENT TO [facility] ONCE SIGNED BY THE DOCTOR.
CM WAITING ADMISSION DETERMINATIONS FROM [facility] AND [facility].
 
LEISA QIU, CASE MANAGEMENT

## 2017-10-30 NOTE — NUR
ROUNDING NOTE: PT IS LAYING IN BED WATCHING TV AT THE CHANGE OF SHIFT. PT IS
A,A,OX3. PT REQUESTING TO TAKE HIS NIGHT TIME MEDS AT 2200 BECAUSE HE LIKES TO
STAY UP LATE AND THEN TAKE HIS MEDS AND GO TO SLEEP RIGHT AFTER. HE IS ALSO
REQUESTING ADDITIONAL SUPPLIES INCLUDING A RAZOR, MOUTHWASH, EXTRA BRIEFS,
EXTRA PADS, AND LOTION. HE WOULD ALSO LIKE TO TAKE TYLENOL ALONG WITH HIS
USUAL BEDTIME MEDS. WILL BRING IN REQUESTED ITEMS AT NIGHT TIME MED PASS. WILL
CONT TO MONITOR.

## 2017-10-30 NOTE — NUR
AM ROUNDS - PT IS IN BED AND APPEARS TO BE SLEEPING WITH EQUAL AND NON LABORED
BREATHING.  NO IV.  PT IS UP AD DEWAYNE.  BED AT LOWEST POSITION.  CALL BELL IN
USE/REACH.  SIDE RAILS UP X2.  WILL CONTINUE TO MONITOR

## 2017-10-31 VITALS — DIASTOLIC BLOOD PRESSURE: 44 MMHG | SYSTOLIC BLOOD PRESSURE: 93 MMHG

## 2017-10-31 VITALS — SYSTOLIC BLOOD PRESSURE: 90 MMHG | DIASTOLIC BLOOD PRESSURE: 51 MMHG

## 2017-10-31 VITALS — DIASTOLIC BLOOD PRESSURE: 62 MMHG | SYSTOLIC BLOOD PRESSURE: 104 MMHG

## 2017-10-31 VITALS — DIASTOLIC BLOOD PRESSURE: 31 MMHG | SYSTOLIC BLOOD PRESSURE: 87 MMHG

## 2017-10-31 NOTE — NUR
ADMINISTERED 650MG OF TYLENOL FOR PAIN LEVEL OF 10/10. PT UP AD DEWAYNE IN ROOM,
TALKING ON THE PHONE, DENIES ANY OTHER NEEDS AT THIS TIME. CALL LIGHT IN
REACH, NAD NOTED, WILL CONTINUE TO MONITOR.

## 2017-10-31 NOTE — NUR
AM ROUNDS- PT IN BED, WITH EYES CLOSED, HEAD TOWARD THE BOTTOM OF BED, PT
AROUSES EASILY TO VOICE. RESP EVEN AND UNLABORED, PT DENIES ANY NEEDS AT THIS
TIME. BED LOW AND WHEELS LOCKED, BEDSIDE RAILS X2, CALL LIGHT IN REACH, NAD
NOTED, WILL CONTINUE TO MONITOR.

## 2017-10-31 NOTE — NUR
AM MEDS GIVEN, PT UP TO SIDE OF BED, DENIES ANY NEEDS AT THIS TIME. URINAL
EMPTIEDED, CALL LIGHT IN REACH, NAD NOTED, WILL CONTINUE TO MONITOR.

## 2017-10-31 NOTE — NUR
PT HAD QUESTIONS ABOUT HIS SEROQUEL MEDICINE. HE STATES THAT IT CAUSES HIM TO
HAVE SEVERE LEG CRAMPS AND LEG PAIN. AS A RESULT OF THESE SIDE EFFECTS, HE
DOESN'T WANT TO TAKE IT ANYMORE. I ADVISED PT TO TALK TO THE DOCTOR TOMORROW
ABOUT HIS QUESTIONS/CONCERNS. PATIENT AGREES TO TALK TO THE MD ABOUT THIS.
AFTER THE NIGHT TIME MED PASS, PT HAS BEEN SLEEPING FOR THE REST OF THE SHIFT.

## 2017-10-31 NOTE — NUR
ROUNDING NOTE: PT IS UP AND WALKING AROUND THE UNIT WITH HIS ROLLATOR WALKER
W/ SEAT. PT IS REQUESTING TYLENOL FOR HIS CHRONIC LEG PAIN. PT HAS NO IV
ACCESS AND CONTINUES TO AWAIT DISCHARGE PLACEMENT. PT STATES THAT HE WILL BE
DOING PAPERWORK WITH  LYDIA TOMORROW. WILL CONT TO MONITOR.

## 2017-11-01 VITALS — DIASTOLIC BLOOD PRESSURE: 61 MMHG | SYSTOLIC BLOOD PRESSURE: 110 MMHG

## 2017-11-01 VITALS — SYSTOLIC BLOOD PRESSURE: 121 MMHG | DIASTOLIC BLOOD PRESSURE: 62 MMHG

## 2017-11-01 VITALS — DIASTOLIC BLOOD PRESSURE: 58 MMHG | SYSTOLIC BLOOD PRESSURE: 87 MMHG

## 2017-11-01 VITALS — SYSTOLIC BLOOD PRESSURE: 108 MMHG | DIASTOLIC BLOOD PRESSURE: 44 MMHG

## 2017-11-01 VITALS — DIASTOLIC BLOOD PRESSURE: 52 MMHG | SYSTOLIC BLOOD PRESSURE: 103 MMHG

## 2017-11-01 VITALS — SYSTOLIC BLOOD PRESSURE: 105 MMHG | DIASTOLIC BLOOD PRESSURE: 63 MMHG

## 2017-11-01 NOTE — NUR
Patient Name: AZUL ETIENNE
Encounter No: [id number]
: 1965
Primary Insurance: MEDICARE A & B
Anticipated DC Date: 10-
Planned Disposition: Assisted Living OR RESIDENTIAL CARE FACILITY
External Planned Provider: FIRST ACCEPTING PROVIDER
 
DCP follow-up note: CM WENT TO [facility] THE [facility], SPOKE TO 
SOTERO GUZMAN (T [phone]). SHAHRAM OF THE BANK HAD SPOKEN TO PT VIA PHONE
YESTERDAY. NEW BANK ACCOUNT OPENED FOR PT, BALANCE TRANSFERRED FROM OLD
ACCOUNT TO NEW ACCOUNT. SOTERO WILL WITHDRAW PT'S NEXT CHECK ON THE THIRD AND
PLACE INTO PT'S NEW ACCOUNT. THE OLD ACCOUNT WILL BE CLOSED. NO NEW DEBIT CARD
WAS ISSUED, PT WILL NEED TO GO TO [facility] AFTER PLACEMENT IS FOUND TO
ADD PERSON TO HIS ACCOUNT AND GET A DEBIT CARD. PT IS THE ONLY SIGNER ON THE
ACCOUNT. CM MET WITH PT IN HIS ROOM, PROVIDED BANK ACCOUNT INFORMATION. PT
THANKFUL AND SIGNED THE PAPERS PROVIDED BY THE [facility] FOR THE NEW BANK ACCOUNT.
CM PROVIDED THE INFORMATION TO CM SUPERVISOR JENNIFER WHO TOOK THE SIGNED
DOCUMENTS (2) TO SOTERO AT THE [facility].
 
CM RECEIVED MESSAGE FROM EVA OF [employer] WHO REPORTED THAT PLACEMENT FOR
PT AT [facility] IS NOT AN OPTION. CM RECEIVED MESSAGE FROM MELANIE OF [facility] THAT THEY WILL NOT ACCEPT PT.
 
CM CALLED [facility], [phone], LEFT MESSAGE ASKING FOR PLACEMENT UPDATE
FROM [facility]; CM FAXED PRIMARY CARE RECORDS TO [facility] [phone]. CM
CALLED [facility], [phone], LEFT MESSAGE ASKING FOR PLACEMENT
UPDATE FROM [facility]; CM FAXED PRIMARY CARE RECORDS TO [facility] AT
[phone].
 
CM CALLED [facility] IN [place], [phone], SPOKE TO AMY. AMY
REPORTS THEY WILL CONSIDER PT FOR PLACEMENT, WILL FAX CM AN APPLICATION; CM
RECEIVED APPLICATION, OBTAINED PT'S SIGNATURE AND FAXED COMPLETED APPLICATION
AND SUPPORTING DOCUMENTS TO [facility] [phone].
 
CM CALLED [facility], [phone], NO BEDS AVAILABLE. CM
CALLED [facility] IN [place], [phone], LEFT MESSAGE
REQUESTING PLACEMENT. CM CALLED [facility] IN [place], [phone],
LEFT MESSAGE REQUESTING PLACEMENT. CM CALLED [facility] IN [place],
[phone], FACILITY IS PRIVATE PAY $1800 MONTHLY WITH EXTRA $100 FOR
MEDICATION MANAGEMENT. CM CALLED [facility] IN [place] [phone],
NOTIFIED THIS WAS FOR [facility]TER[facility] ONLY. CM RECEIVED RETURN CALL FROM [facility] AND [facility], THEY HAVE NO AVAILABLE BEDS.
 
CM CALLED THE [facility] IN [place], [phone], DISCUSSED REFERRAL,
FAXED TO NEL [phone].
 
CM WAITING RETURN CALL FROM [facility] REGARDING BED AVAILABILITY. CM
WAITING COMPLETION OF [facility]ION ASSESSMENT REGARDING APPROPRIATENESS FOR PLACEMENT
IN SKILLED NURSING FACILITY.
CM WAITING ADMISSION DETERMINATIONS FROM RESIDENTAL CARE FACILITIES: [facility], [facility].
 
Grady Goff, CASE MANAGEMENT

## 2017-11-01 NOTE — NUR
PT CALLED TO DISCUSS SOME PROBLEMS HE STATES OCCUR WHEN HE TAKES SEROQUEL. PT
STATES IT MAKES HIM GROGGY AND FEEL BAD. PT STATES HE TOOK HALDOL 1MG BID FOR
YEARS AND DID WELL WITH IT. PT STATES HE IS SEEING A COUNSELOR TOMORROW FROM
[facility] THAT IS TO COME HERE AND SEE HIM. PT IS ALSO C/O BLE
PAIN ASKING FOR TYLENOL. I ASSURRED PT THAT I WOULD DISCUSS THIS WITH [facility]
NURSE TO THAT IT CAN BE PASSED ON TO ROUNDING PHYSICIAN. CONTINUE TO MONITOR
CLOSELY

## 2017-11-01 NOTE — NUR
Nutrition follow-up:
Diet: Regular
PO intake 100% of meals
Labs reviewed
+BM
Wt: 152#
RDN following.

## 2017-11-01 NOTE — NUR
AM ROUNDS - PT IS AWAKE AND WALKING AROUND HIS ROOM.  NO IV.  PT IS ON ROOM
AIR.  PT IS A&O.  HAS PERSONAL SLIPPERS ON.  BED AT LOWEST POSITIO.  CALL ANGEL
ON THE BED.  SIDE RAILS UP X1.  NO NEEDS AT THIS TIME.  WILL CONTINUE TO
MONITOR

## 2017-11-01 NOTE — NUR
PT RESTING COMFORTABLY, EASILY ROUSABLE TO VERBAL STIMULI. PT REQUESTING A HOT
CHOCOLATE, NO OTHER NEEDS. CONTINUE TO MONITOR CLOSELY. BED LOW, CALL LIGHT IN
REACH, SIDE RAILS X 2, HOB 10 DEGREES.

## 2017-11-02 VITALS — SYSTOLIC BLOOD PRESSURE: 83 MMHG | DIASTOLIC BLOOD PRESSURE: 50 MMHG

## 2017-11-02 VITALS — DIASTOLIC BLOOD PRESSURE: 44 MMHG | SYSTOLIC BLOOD PRESSURE: 114 MMHG

## 2017-11-02 VITALS — SYSTOLIC BLOOD PRESSURE: 93 MMHG | DIASTOLIC BLOOD PRESSURE: 65 MMHG

## 2017-11-02 NOTE — NUR
PT WALKING AROUND UNIT, ASKING FOR GARRET, NO OTHER NEEDS. PT REMAINS AWAKE,
ALERT, ORIENTED, AND IS ASKING FOR TYLENOL WITH HIS 21:00 MEDS. CONTINUE TO
MONITOR CLOSELY.

## 2017-11-03 VITALS — DIASTOLIC BLOOD PRESSURE: 60 MMHG | SYSTOLIC BLOOD PRESSURE: 104 MMHG

## 2017-11-03 VITALS — DIASTOLIC BLOOD PRESSURE: 50 MMHG | SYSTOLIC BLOOD PRESSURE: 90 MMHG

## 2017-11-03 VITALS — DIASTOLIC BLOOD PRESSURE: 57 MMHG | SYSTOLIC BLOOD PRESSURE: 95 MMHG

## 2017-11-03 VITALS — SYSTOLIC BLOOD PRESSURE: 90 MMHG | DIASTOLIC BLOOD PRESSURE: 45 MMHG

## 2017-11-03 VITALS — SYSTOLIC BLOOD PRESSURE: 97 MMHG | DIASTOLIC BLOOD PRESSURE: 52 MMHG

## 2017-11-03 VITALS — DIASTOLIC BLOOD PRESSURE: 77 MMHG | SYSTOLIC BLOOD PRESSURE: 134 MMHG

## 2017-11-03 NOTE — NUR
PT RESTING COMFORTABLY, LYING ON HIS RIGHT SIDE, EYES CLOSED, RESPIRATIONS
EVEN AND UNLABORED. PT IS EASILY ROUSABLE TO VERBAL STIMULI. CONTINUE TO
MONITOR CLOSELY. BED LOW, CALL LIGHT IN REACH, SIDE RAILS X 2, HOB FLAT.

## 2017-11-03 NOTE — NUR
Patient Name: AZUL ETIENNE
Encounter No: [id number]
: 1965
Primary Insurance: MEDICARE A & B
Anticipated DC Date: 10-
Planned Disposition: Assisted Living OR RESIDENTAL CARE FACILITY
External Planned Provider: FIRST ACCEPTING FACILITY
 
 
DCP follow-up note: CM CALLED [facility], [phone], LEFT MESSAGE ASKING
FOR PLACEMENT UPDATE FROM AL AND WAS ADVISED THEY ARE STILL LOOKING FOR
PRIVATE ACCOMODATIONS FOR PT AND IT WILL BE MONDAY AT THE [facility], IF THEY CAN
FIND A PRIVATE ROOM FOR PT.
 
CM CALLED [facility], [phone], LEFT MESSAGE ASKING FOR PLACEMENT
UPDATE FROM [facility].
 
CM CALLED [facility] IN [place], [phone], SPOKE TO AMY. AMY
REPORTS THEY ARE STILL CONSIDERING PT FOR PLACEMENT, WILL HAVE DECISION BY
NEXT MONDAY.
 
CM CALLED [facility], [phone], NO BEDS AVAILABLE. CM
CALLED THE [facility] IN [place], [phone], NEL HAS NOT YET REVIEWED
REFERRAL.
 
CM CALLED [phone], LEFT DETAILED MESSAGE FOR [facility] REGARDING BED
AVAILABILITY / ADMISSION SCREENING.
 
CM REFAXED LION ASSESSMENT, SPOKE TO BLISS WHO REPORTS PT HAS NO SKILLED NEED
AND IS NOT APPRORIATE FOR SKILLED NURSING PLACEMENT FOR LONG TERM CARE. PT MAY
RISK [facility] MEDICAID WAIVER SERVICES IF PROCEEDING WITH LION ASSESSMENT; CM
CANCELLED LION ASSESSMENT; ZUNILDA WILL FAX CM LISTS OF ASSISTED LIVING
FACILITIES IN [place] TO ASSIST CM WITH PLACEMENT SEARCH. CM RECEIVED LIST OF
[facility] ASSISTED LIVING FACILITIES.
 
CM RECEIVED APPLICATION FOR [facility] IN [place], CM COMPLETED APPLICATION,
FAXED TO [facility] [phone]; FAX MACHINE NOT WORKING, CM CALLED
[facility], [phone], WHO PROVIDED PHONE NUMBER FOR
[facility]; CM CALLED [facility] AND OBTAINED ALTERNATIVE FAX FOR [facility]OM[facility];
FAXED REFERRAL [phone]. CM CALLED [facility] [phone], WAS
ADVISED BY NABEEL THAT THEY TRY TO HAVE ADMISSION DETERMINATIONS WITHIN 48
HOURS AND WILL CALL CM IF AND WHEN THE DETERMINATION IS MADE. PT UPDATED ON
ATTEMPTS AT PLACEMENT, PT IS STILL WILLING FOR PLACEMENT ANYWHERE IN [place],
BUT WOULD PREFER TO STAY IN [place] OR [facility] TO CONTINUE
SERVICES AT THE [facility].
 
CM WAITING ADMISSION DETERMINATIONS FROM RESIDENTAL CARE FACILITIES:
[facility], [facility], [facility] AND [facility].
 
 
Grady Goff, CASE MANAGEMENT

## 2017-11-03 NOTE — NUR
ROUNDING NOTE: PT IS UP AMBULATING AROUND UNIT AT THE CHANGE OF SHIFT. PT IS
REQUESTING LEMON LIME SODA AND WATER. PT HAS NO IV ACCESS. HE REPORTS SEVERE
LEG PAIN. WILL MEDICATE WITH TYLENOL PER MD ORDER. PT IS AWARE THAT IS ALL HE
HAS ORDERED FOR PAIN AT THIS TIME. PT IS HERE AWAITING PLACEMENT. WILL CONT TO
MONITOR.

## 2017-11-04 VITALS — SYSTOLIC BLOOD PRESSURE: 104 MMHG | DIASTOLIC BLOOD PRESSURE: 62 MMHG

## 2017-11-04 VITALS — DIASTOLIC BLOOD PRESSURE: 63 MMHG | SYSTOLIC BLOOD PRESSURE: 126 MMHG

## 2017-11-04 VITALS — DIASTOLIC BLOOD PRESSURE: 70 MMHG | SYSTOLIC BLOOD PRESSURE: 114 MMHG

## 2017-11-04 VITALS — SYSTOLIC BLOOD PRESSURE: 116 MMHG | DIASTOLIC BLOOD PRESSURE: 58 MMHG

## 2017-11-04 VITALS — DIASTOLIC BLOOD PRESSURE: 54 MMHG | SYSTOLIC BLOOD PRESSURE: 97 MMHG

## 2017-11-04 VITALS — SYSTOLIC BLOOD PRESSURE: 103 MMHG | DIASTOLIC BLOOD PRESSURE: 61 MMHG

## 2017-11-04 VITALS — SYSTOLIC BLOOD PRESSURE: 95 MMHG | DIASTOLIC BLOOD PRESSURE: 55 MMHG

## 2017-11-04 NOTE — NUR
PT OUT IN HALLWAY WALKING AROUND GETTING "EXERCISE". HYPERTALKATIVE AND
RAMBLES FROM ONE TOPIC TO ANOTHER IN CONVERSATION. NONLABORED RESPIRATIONS.
SEE SHIFT ASSESSMENT. CPOC.

## 2017-11-04 NOTE — NUR
ROUNDING DONE WITH PATIENT ASKING ME WHAT UNIT HE IS ON. I EXPLAINED HE WAS ON
THE CARDIAC RENAL UNIT. HE ASKED IF HE WAS OKAY AND I ANSWERED YES. ON ROOM
AIR. WILL MONITOR.

## 2017-11-04 NOTE — NUR
PATIENT HAS BEEN AMBULATING TO THE DESK USING HIS OWN ROLLING WALKER. DENIES
ANY NEEDS AT PRESENT TIME.

## 2017-11-05 VITALS — DIASTOLIC BLOOD PRESSURE: 73 MMHG | SYSTOLIC BLOOD PRESSURE: 124 MMHG

## 2017-11-05 VITALS — SYSTOLIC BLOOD PRESSURE: 118 MMHG | DIASTOLIC BLOOD PRESSURE: 51 MMHG

## 2017-11-05 VITALS — DIASTOLIC BLOOD PRESSURE: 64 MMHG | SYSTOLIC BLOOD PRESSURE: 108 MMHG

## 2017-11-05 VITALS — DIASTOLIC BLOOD PRESSURE: 79 MMHG | SYSTOLIC BLOOD PRESSURE: 117 MMHG

## 2017-11-05 VITALS — SYSTOLIC BLOOD PRESSURE: 91 MMHG | DIASTOLIC BLOOD PRESSURE: 53 MMHG

## 2017-11-05 NOTE — NUR
ROUNDING DONE WITH PATIENT APPEARING TO BE ALSEEP. RESP ARE EVEN AND NON
LABORED. NO IV ACCESS. WILL MONITOR.

## 2017-11-06 VITALS — DIASTOLIC BLOOD PRESSURE: 49 MMHG | SYSTOLIC BLOOD PRESSURE: 90 MMHG

## 2017-11-06 VITALS — DIASTOLIC BLOOD PRESSURE: 54 MMHG | SYSTOLIC BLOOD PRESSURE: 99 MMHG

## 2017-11-06 VITALS — DIASTOLIC BLOOD PRESSURE: 73 MMHG | SYSTOLIC BLOOD PRESSURE: 110 MMHG

## 2017-11-06 VITALS — DIASTOLIC BLOOD PRESSURE: 85 MMHG | SYSTOLIC BLOOD PRESSURE: 128 MMHG

## 2017-11-06 VITALS — SYSTOLIC BLOOD PRESSURE: 109 MMHG | DIASTOLIC BLOOD PRESSURE: 60 MMHG

## 2017-11-06 VITALS — SYSTOLIC BLOOD PRESSURE: 119 MMHG | DIASTOLIC BLOOD PRESSURE: 61 MMHG

## 2017-11-06 NOTE — NUR
Patient Name: AZUL ETIENNE
Encounter No: [id number]
: 1965
Primary Insurance: MEDICARE A & B
Anticipated DC Date: 10-
Planned Disposition: Assisted Living OR RESIDENTAL CARE FACILITY
External Planned Provider: FIRST ACCEPTING FACILITY
 
 
DCP follow-up note:
CM CALLED [facility], [phone], SPOKE TO LINDA WHO REPORTS THEY HAVE
PREVIOUSLY ASSESSED AND DECLINED PT.
 
CM CALLED [facility], [phone], LEFT MESSAGE ASKING FOR PLACEMENT
UPDATE FROM [facility].
 
CM CALLED [facility] IN [place], [phone], ADVISED BY  THAT THE
 IS STILL REVIEWING APPLICATION.
 
CM CALLED [facility], [phone], NO BEDS AVAILABLE.
 
CM CALLED THE [facility] IN [place], [phone], NEL HAS REVIEWED AND
WILL NOT ACCEPT PT.
 
CM CALLED [phone], LEFT DETAILED MESSAGE FOR [facility] REGARDING BED
AVAILABILITY / ADMISSION SCREENING. CM CALLED AGAIN AND WAS ADVISED FACILITY
DOES NOT ACCEPT MEDICAID, IS PRIVATE PAY OF $2560 PER MONTH.
 
[facility], [phone], WAS ADVISED BY AL THEY ARE STILL
LOOKING FOR PRIVATE ACCOMODATIONS FOR PT.
 
CM CALLED [facility], [phone], SPOKE TO COURTNEY WHO REPORTS PT MUST BE OVER
65 YEARS OF AGE FOR PLACEMENT CONSIDERATION.
 
CM CALLED [facility], [phone], LEFT MESSAGE FOR GIOVANNA TO CALL CM
BACK, FAXED REFERRAL [phone].
 
CM CALLED [facility], [phone], SPOKE TO LUCERO,
DISCUSSED PT AND NEED FOR PLACEMENT, FAXED FACE SHEET AND H&P AS REQUESTED TO
[phone].
 
CM RECEIVED APPLICATION FOR [facility] IN [place], [phone], LEFT
MESSAGER FOR NABEEL ASKING FOR UPDATE ON PLACEMENT APPLICATION.
 
CM UPDATED PT AND WILL CONTINUE TO CALL [facility] FACILITIES TO SEEK
PLACEMENT.
 
Leisa Qiu, CASE MANGEMENT
 
Appended by Leisa Qiu on 2017 16:34 CST:
CM CALLED [facility], [phone], SPOKE TO SAGRARIO WHO ADVISED
THEY HAVE NO AVAILABLE MEDICAID BEDS.
 
CM RECEIVED CALL FROM [facility], SPOKE TO TRIPP WHO ADVISED THEY ARE
PRIVATE PAY ONLY AT $108 PER DAY.
 
AT PT'S REQUEST, CM CALLED [facility], [phone], SPOKE TO
TIFFANY WHO HAS SPOKEN TO PT AND INFORMED CM THAT PT'S INCOME WILL NEED TO BE
MORE THAN $1250 MONTHLY TO BE CONSIDERED AT HIS COMPLEX.
 
CM RECEIVED CALL BACK FROM [facility], SPOKE TO SILVINA WHO REPORTS APPLICANT
MUST BE 61 OR OLDER; SILVINA RECOMMENDED [facility] FOR PLACEMENT (REFERRAL
PENDING AT [facility] ALREADY).
 
CM SPOKE TO KURT GILLESPIE, [facility] IN [place], [phone], WHO
REPORTS HE WILL DISCUSS PT WITH WIFE AND CALL CM BACK.
 
CM RECEIVED CALL FROM [facility] IN [place], [phone],
SHE HAS NO LOW INCOME APARTMENTS AVAILABLE BUT WILL CHECK WITH HER SUPERVISOR
IN [place] FOR ANY POSSIBILITIES WITH [facility]. (RENT BASED ON
30% OF INCOME.)
 
CM WILL CONTINUE TO SEEK PLACEMENT FOR PT [facility].
 
LEISA QIU, CASE MANAGEMENT
 
Appended by Leisa Qiu on 2017 17:01 CST:
CM FAXED REFERRALS TO [facility], [phone] / [facility], [phone] / [facility], [phone].
 
CM WILL FOLLOW UP WITH THESE FACILITIES AND CONTINUE TO SEEK [facility]
PLACEMENT FOR THIS PATIENT.
 
LEISA QIU, CASE MANAGEMENT

## 2017-11-06 NOTE — NUR
PT UP WITH WALKER. NO O2. NO IV. ALERT & ORIENTED. PT DENIES ANY NEEDS AT THIS
TIME. BED IN LOWEST POSITION AND CALL LIGHT WITHIN REACH.

## 2017-11-06 NOTE — NUR
PT HAS RESTED THROUGHOUT THE NIGHT WITH NO DISTRESS. NO CHANGE FROM INITIAL
SHIFT ASSESSMET. CPOC. CALL LIGHT IN REACH.

## 2017-11-06 NOTE — NUR
AM MEDS GIVEN AT THIS TIME, PT UP TO SIDE OF BED, DENIES ANY NEEDS AT THIS
TIME. CALL LIGHT IN REACH, NAD NOTED, WILL CONTINUE TO MONITOR.

## 2017-11-07 VITALS — DIASTOLIC BLOOD PRESSURE: 68 MMHG | SYSTOLIC BLOOD PRESSURE: 107 MMHG

## 2017-11-07 VITALS — DIASTOLIC BLOOD PRESSURE: 66 MMHG | SYSTOLIC BLOOD PRESSURE: 102 MMHG

## 2017-11-07 VITALS — SYSTOLIC BLOOD PRESSURE: 105 MMHG | DIASTOLIC BLOOD PRESSURE: 58 MMHG

## 2017-11-07 VITALS — DIASTOLIC BLOOD PRESSURE: 48 MMHG | SYSTOLIC BLOOD PRESSURE: 93 MMHG

## 2017-11-07 VITALS — SYSTOLIC BLOOD PRESSURE: 124 MMHG | DIASTOLIC BLOOD PRESSURE: 65 MMHG

## 2017-11-07 VITALS — DIASTOLIC BLOOD PRESSURE: 73 MMHG | SYSTOLIC BLOOD PRESSURE: 112 MMHG

## 2017-11-07 NOTE — NUR
PT UP IN ROOM AMBULATING. BREATHING EVEN AND UNLABORED. DENIES ANY PAIN OR
NEEDS AT THIS TIME. WILL CTM.

## 2017-11-07 NOTE — NUR
ALERT AND ORIENTED X4. UP AMBULATING IN [place] WITH WALKER. DENIES ANY NEEDS.
CONTINUE PLAN OF CARE AND SAFETY PRECAUTIONS.

## 2017-11-08 VITALS — SYSTOLIC BLOOD PRESSURE: 129 MMHG | DIASTOLIC BLOOD PRESSURE: 89 MMHG

## 2017-11-08 VITALS — DIASTOLIC BLOOD PRESSURE: 66 MMHG | SYSTOLIC BLOOD PRESSURE: 125 MMHG

## 2017-11-08 VITALS — DIASTOLIC BLOOD PRESSURE: 54 MMHG | SYSTOLIC BLOOD PRESSURE: 99 MMHG

## 2017-11-08 VITALS — SYSTOLIC BLOOD PRESSURE: 90 MMHG | DIASTOLIC BLOOD PRESSURE: 47 MMHG

## 2017-11-08 VITALS — DIASTOLIC BLOOD PRESSURE: 44 MMHG | SYSTOLIC BLOOD PRESSURE: 112 MMHG

## 2017-11-08 VITALS — DIASTOLIC BLOOD PRESSURE: 78 MMHG | SYSTOLIC BLOOD PRESSURE: 114 MMHG

## 2017-11-08 NOTE — NUR
PT SITTING IN CHAIR IN ROOM. AAO X4. PT VERY IMPULSIVE AND MANIC. PT WALKS
WITH NO ASSIST. PT DENIES ANY NEEDS. NO S/S OF DISTRESS. WILL CPOC

## 2017-11-08 NOTE — NUR
ALERT AND ORIENTED X4. UP AMBULATING IN [place] WITH PERSONAL WALKER. GAIT STEADY
WITH WALKER. DENIES ANY NEEDS. CONTINUE PLAN OF CARE AND SAFETY PRECAUTIONS.

## 2017-11-08 NOTE — NUR
Patient Name: AZUL ETIENNE
Encounter No: [id number]
: 1965
Primary Insurance: MEDICARE A & B
Anticipated DC Date: 10-
Planned Disposition: Assisted Living, PRIVATE CARE HOME OR RESIDENTIAL CARE
FACILITY
External Planned Provider: FIRST ACCEPTING PROVIDER
 
 
DCP follow-up note: CM MET WITH PT IN ROOM TO DISCUSS HIS INTERVIEW LAST NIGHT
WITH PRIVATE CARE HOME OWNER, DEVON. PT REPORTS THAT HE IS AGREEABLE TO
PLACEMENT WITH DEVON IF SHE WILL TAKE HIM. PT IS UNSURE IF HE HAS THE MONEY
REQUIRED FOR HIS CARE. CM CALLED DEVON [phone]; DEVON REPORTS SHE WANTS
TO HELP PT, SHE NORMALLY REQUIRES $1500 PER MONTH, BUT THINKS SHE WILL TAKE
$700 MONTHLY. DEVON WOULD LIKE TO MEET WITH PT AND CM LATER TODAY TO DISCUSS
POSSIBLE PLACEMENT FURTHER. DEVON WILL CALL CM WITH MEETING TIME WHEN SHE
DETERMINES WHAT TIME SHE CAN BE AVAILABLE TODAY.
 
CM WAITING MEETING WITH PRIVATE CARE HOME OWNER DEVON FOR TODAY TO DISCUSS
POSSIBLE LIVING ARRANGEMENT FOR PT.
 
Grady Goff, CASE MANAGEMENT

## 2017-11-08 NOTE — NUR
Nutrition Follow Up:
Pt is eating 98% meal avg on an AHA diet. +BM 11/7/17. Meds and labs noted.
Rec continue current diet.
RD following.

## 2017-11-09 VITALS — SYSTOLIC BLOOD PRESSURE: 94 MMHG | DIASTOLIC BLOOD PRESSURE: 45 MMHG

## 2017-11-09 VITALS — SYSTOLIC BLOOD PRESSURE: 124 MMHG | DIASTOLIC BLOOD PRESSURE: 60 MMHG

## 2017-11-09 VITALS — DIASTOLIC BLOOD PRESSURE: 52 MMHG | SYSTOLIC BLOOD PRESSURE: 105 MMHG

## 2017-11-09 VITALS — DIASTOLIC BLOOD PRESSURE: 64 MMHG | SYSTOLIC BLOOD PRESSURE: 106 MMHG

## 2017-11-09 VITALS — SYSTOLIC BLOOD PRESSURE: 108 MMHG | DIASTOLIC BLOOD PRESSURE: 58 MMHG

## 2017-11-09 NOTE — NUR
PT SITTING ON SIDE OF BED. NAME AND DATE PLACED ON BOARD AND BEDSIDE REPORT
RECEIVED. PT DENIES ANY NEEDS AT THIS TIME. NO S/S OF DISTRESS. BED LOW AND
CALL LIGHT IN REACH. WILL CPOC

## 2017-11-09 NOTE — NUR
LAYING ON RIGHT SIDE, APPEARS TO BE ALSEEP. RESP ARE EVEN AND NON LABORED.
WILL CONTINUE TO MONITOR.

## 2017-11-09 NOTE — NUR
AM ROUNDING DONE WITH PATIENT APPEARING TO BE ALSEEP, RESP ARE EVEN AND NON
LABORED. ON ROOM AIR. WILL MONITOR.

## 2017-11-10 VITALS — DIASTOLIC BLOOD PRESSURE: 64 MMHG | SYSTOLIC BLOOD PRESSURE: 108 MMHG

## 2017-11-10 VITALS — SYSTOLIC BLOOD PRESSURE: 99 MMHG | DIASTOLIC BLOOD PRESSURE: 59 MMHG

## 2017-11-10 NOTE — NUR
Patient Name: AZUL ETIENNE
Encounter No: [id number]
: 1965
Primary Insurance: MEDICARE A & B
Anticipated DC Date: 11-
Planned Disposition: Home
 
LATE ENTRY:
DCP follow-up note: DEVON ARCE ARRIVED TO  PT. SHE PROVIDED HOME
ADDRESS OF [address] [place] [address], [id number]; [phone]. PT IN
AGREEMENT WITH DISCHARGE PLAN. DEVON THINKS PT WILL STAY A SHORT TIME IN
ANOTHER HOME SHE HAS IN [place] AND MOVE BACK TO [place] ONCE SHE HAS A
ROOM [place]. CM CALLED NELLIE SLAUGHTER WITH [facility] [employer],
NOTIFIED OF DISCHARGE WITH ADDRESS AND PHONE NUMBER OF PERSON ASSISTING WITH
PT'S CARE.
 
Grady Goff, CASE MANGEMENT

## 2017-11-10 NOTE — NUR
1030-3 PACKAGES OF PATIENT'S HOME MEDS GIVEN TO DEVON ARCE (CAREGIVER) AND
WRITTEN AND VERBAL INSTRUCTIONS ALSO GIVEN TO HER. PATIENT AMBULATED WITH DEVON
USING HIS OWN WALKER.

## 2017-11-10 NOTE — NUR
PATIENT STATES HE HAD FLU VACCINE AT DR. DWYER'S OFFICE 6 WEEKS AGO AND
PNEUMONIA VACCINE 4 YEARS AGO. DECLINES BOTH

## 2017-11-11 NOTE — DS
PATIENT:AZUL ETIENNE                   :[birth year]   MEDICAL RECORD: [id number]
 
                              DISCHARGE SUMMARY
                                                         
ADMISSION DATE:    10/24/17                       DISCHARGE DATE:     11/10/17
 
 
DATE OF ADMISSION:  10/24/2017.
 
DATE OF DISCHARGE:  11/10/2017.
 
ADMISSION DIAGNOSES:  The patient with multiple psychiatric issues and was
kicked out of his group home, admitted for placement and no medical problems.
 
DISCHARGE DIAGNOSES:  Patient needed home placement.
 
HOSPITAL COURSE:  Uneventful hospital course, consult case management for
placement, no medical intervention during this entire hospitalization.  The
patient discharged to group home.  He will follow up with his primary care
physician, Dr. Garcia, follow up with his specialist, Dr. Donnie Eller.
 
TRANSINT:[id number] Voice Confirmation ID: [id number] DOCUMENT ID: [id number]
                                           
                                           RACQUEL RIOS DO            
 
 
 
Electronically Signed by RACQUEL CUBA on 17 at 0954
 
 
 
 
 
 
 
 
 
 
 
 
 
 
 
 
 
 
 
 
 
 
CC:                                                             [id number]
DICTATION DATE: 17 0743     :     17 1231      DIS IN  
                                                                      11/10/17
[id number] [place] [address]

## 2017-11-14 NOTE — NUR
Patient Name: AZUL ETIENNE
Encounter No: [id number]
: 1965
Primary Insurance: MEDICARE A & B
Anticipated DC Date: 11-
Planned Disposition: Home
 
 
DCP follow-up note: CM RECEIVED CALL FROM STEVIE WHO REPORTS THAT PT HAS
TOO MANY MEDICAL NEEDS FOR THE [facility]. CM RECEIVED CALL FROM DEVON WHO REPORTS
THAT SHE IS NOT ABLE TO KEEP PT AS PLANNED IN HER HOME; PT IS STAYING WITH HIS
MOTHER AT HOME; CM RECEIVED CALL FROM PT'S MOTHER WHO REPORTS THAT PT STAYED
THE NIGHT WITH HER BUT CANNOT LIVE WITH HER. CM SPOKE TO PT ON THE PHONE,
NOTIFIED THAT PT WAS APPROVED BY [facility] ON YESTERDAY TO ENTER SKILLED NURSING
HOME IN THE NEXT THIRTY DAYS. PT ASKED THAT CM FAX THE [facility]PROVAL [facility] WHO HE CALLED WHILE IN THE HOSPITAL. ELIAS SINCLAIR,
PT'S NEW FRIEND, IS ASSISTING WITH FINDING PLACEMENT, [phone]. CM CALLED
[facility], [phone], SPOKE TO ED, PROVIDED [facility] PHONE NUMBER AND
RELAYED PT'S REQUEST FOR LONG TERM CARE PLACEMENT. CM FAXED [facility] EXEMPTION AND
REFERRAL FOR PLACEMENT AS ED HAD ALREADY SHREADED THE REFERRAL THAT SHE AS
PROVIDED. ED WILL CONTACT PT AND ELIAS SINCLAIR WITH ADMISSION DETERMINATION.
 
LEISA QIU, CASE MANAGEMENT

## 2018-09-26 ENCOUNTER — HOSPITAL ENCOUNTER (OUTPATIENT)
Dept: HOSPITAL 84 - D.CN | Age: 53
Discharge: HOME | End: 2018-09-26
Attending: INTERNAL MEDICINE
Payer: MEDICARE

## 2018-09-26 VITALS — BODY MASS INDEX: 26.1 KG/M2

## 2018-09-26 DIAGNOSIS — B20: Primary | ICD-10-CM
